# Patient Record
Sex: FEMALE | Race: WHITE | URBAN - METROPOLITAN AREA
[De-identification: names, ages, dates, MRNs, and addresses within clinical notes are randomized per-mention and may not be internally consistent; named-entity substitution may affect disease eponyms.]

---

## 2022-11-30 ENCOUNTER — ESTABLISHED PATIENT (OUTPATIENT)
Dept: URBAN - METROPOLITAN AREA CLINIC 21 | Facility: CLINIC | Age: 47
End: 2022-11-30

## 2022-11-30 DIAGNOSIS — H04.123: ICD-10-CM

## 2022-11-30 DIAGNOSIS — H52.4: ICD-10-CM

## 2022-11-30 DIAGNOSIS — H43.393: ICD-10-CM

## 2022-11-30 PROCEDURE — 92250 FUNDUS PHOTOGRAPHY W/I&R: CPT

## 2022-11-30 PROCEDURE — 92015 DETERMINE REFRACTIVE STATE: CPT

## 2022-11-30 PROCEDURE — 92014 COMPRE OPH EXAM EST PT 1/>: CPT

## 2022-11-30 ASSESSMENT — TONOMETRY
OD_IOP_MMHG: 18
OS_IOP_MMHG: 20

## 2022-11-30 ASSESSMENT — VISUAL ACUITY
OU_CC: 20/20
OS_CC: 20/30
OD_CC: 20/20
OU_CC: J1

## 2022-12-14 ENCOUNTER — ESTABLISHED PATIENT (OUTPATIENT)
Dept: URBAN - METROPOLITAN AREA CLINIC 21 | Facility: CLINIC | Age: 47
End: 2022-12-14

## 2022-12-14 PROCEDURE — 99211NC NO CHARGE VISIT

## 2022-12-14 ASSESSMENT — TONOMETRY
OD_IOP_MMHG: 20
OS_IOP_MMHG: 23

## 2022-12-14 ASSESSMENT — VISUAL ACUITY
OS_CC: 20/20
OD_CC: 20/20
OU_CC: 20/20

## 2023-06-20 ENCOUNTER — TELEPHONE ENCOUNTER (OUTPATIENT)
Dept: URBAN - METROPOLITAN AREA CLINIC 72 | Facility: CLINIC | Age: 48
End: 2023-06-20

## 2023-07-11 ENCOUNTER — OFFICE VISIT (OUTPATIENT)
Dept: URBAN - METROPOLITAN AREA CLINIC 72 | Facility: CLINIC | Age: 48
End: 2023-07-11
Payer: COMMERCIAL

## 2023-07-11 ENCOUNTER — LAB OUTSIDE AN ENCOUNTER (OUTPATIENT)
Dept: URBAN - METROPOLITAN AREA CLINIC 72 | Facility: CLINIC | Age: 48
End: 2023-07-11

## 2023-07-11 VITALS
HEART RATE: 78 BPM | WEIGHT: 149 LBS | TEMPERATURE: 97.3 F | RESPIRATION RATE: 16 BRPM | SYSTOLIC BLOOD PRESSURE: 118 MMHG | HEIGHT: 63 IN | BODY MASS INDEX: 26.4 KG/M2 | DIASTOLIC BLOOD PRESSURE: 69 MMHG

## 2023-07-11 DIAGNOSIS — Z12.11 SCREENING FOR COLON CANCER: ICD-10-CM

## 2023-07-11 DIAGNOSIS — K21.9 CHRONIC GERD: ICD-10-CM

## 2023-07-11 DIAGNOSIS — R10.12 LEFT UPPER QUADRANT PAIN: ICD-10-CM

## 2023-07-11 PROBLEM — 235595009: Status: ACTIVE | Noted: 2023-07-11

## 2023-07-11 PROBLEM — 82423001: Status: ACTIVE | Noted: 2023-07-11

## 2023-07-11 PROCEDURE — 99204 OFFICE O/P NEW MOD 45 MIN: CPT | Performed by: INTERNAL MEDICINE

## 2023-07-11 RX ORDER — ESCITALOPRAM OXALATE 5 MG/1
TAKE 1 TABLET BY MOUTH EVERY DAY TABLET ORAL
Qty: 90 EACH | Refills: 0 | Status: ON HOLD | COMMUNITY

## 2023-07-11 RX ORDER — PANTOPRAZOLE SODIUM 40 MG/1
TAKE 1 TABLET TWICE A DAY BY ORAL ROUTE FOR 90 DAYS TABLET, DELAYED RELEASE ORAL
Qty: 180 EACH | Refills: 0 | Status: ACTIVE | COMMUNITY

## 2023-07-11 RX ORDER — CYCLOBENZAPRINE 5 MG/1
TAKE 1 TABLET 3 TIMES A DAY BY ORAL ROUTE AS NEEDED TABLET, FILM COATED ORAL
Qty: 20 EACH | Refills: 0 | Status: ON HOLD | COMMUNITY

## 2023-07-11 RX ORDER — VENLAFAXINE HYDROCHLORIDE 37.5 MG/1
TAKE 1 CAPSULE BY MOUTH EVERY DAY *** NEEDS APPOINTENT FOR FURTHER REFILLS *** CAPSULE, EXTENDED RELEASE ORAL
Qty: 90 EACH | Refills: 0 | Status: ACTIVE | COMMUNITY

## 2023-07-11 NOTE — HPI-TODAY'S VISIT:
48-year-old female new to the clinic referred by Dr. Wilder for abdominal pain and colonoscopy consult.   A copy of this note will be sent to the referring provider.  Past medical history GERD, anxiety, recurrent UTIs.  Status post laparoscopy 2010 for endometriosis.  CT abdomen and pelvis without contrast 5/31/2023 no acute abdominal or pelvic process.  Scattered atherosclerotic vascular calcifications noted in aorta.  On interview today she reports LUQ pain started years ago getting worse.  Waxes and wanes.  Had some weight gain.  On pantoprazole for GERD.  Bowels are moving normally without blood per rectum.  Takes dicyclomine sparingly    Had flex sig when she was in her 30's Had EGD when she was in college and told she had GERD.

## 2023-07-11 NOTE — PHYSICAL EXAM GASTROINTESTINAL
soft, nondistended, mild tendnerness LUQ right under ribs, no guarding or rigidity, no rebound tenderness

## 2023-08-10 ENCOUNTER — TELEPHONE ENCOUNTER (OUTPATIENT)
Dept: URBAN - METROPOLITAN AREA CLINIC 107 | Facility: CLINIC | Age: 48
End: 2023-08-10

## 2023-08-14 ENCOUNTER — OFFICE VISIT (OUTPATIENT)
Dept: URBAN - METROPOLITAN AREA MEDICAL CENTER 40 | Facility: MEDICAL CENTER | Age: 48
End: 2023-08-14
Payer: COMMERCIAL

## 2023-08-14 DIAGNOSIS — K21.9 CHRONIC GERD: ICD-10-CM

## 2023-08-14 DIAGNOSIS — K29.80 DUODENITIS: ICD-10-CM

## 2023-08-14 DIAGNOSIS — K22.10 EROSIVE ESOPHAGITIS: ICD-10-CM

## 2023-08-14 DIAGNOSIS — R10.12 LEFT UPPER QUADRANT PAIN: ICD-10-CM

## 2023-08-14 DIAGNOSIS — K29.60 OTHER GASTRITIS WITHOUT BLEEDING: ICD-10-CM

## 2023-08-14 PROCEDURE — 43239 EGD BIOPSY SINGLE/MULTIPLE: CPT | Performed by: INTERNAL MEDICINE

## 2023-08-14 RX ORDER — PANTOPRAZOLE SODIUM 40 MG/1
TAKE 1 TABLET TWICE A DAY BY ORAL ROUTE FOR 90 DAYS TABLET, DELAYED RELEASE ORAL
Qty: 180 EACH | Refills: 0 | Status: ACTIVE | COMMUNITY

## 2023-08-14 RX ORDER — VENLAFAXINE HYDROCHLORIDE 37.5 MG/1
TAKE 1 CAPSULE BY MOUTH EVERY DAY *** NEEDS APPOINTENT FOR FURTHER REFILLS *** CAPSULE, EXTENDED RELEASE ORAL
Qty: 90 EACH | Refills: 0 | Status: ACTIVE | COMMUNITY

## 2023-08-14 RX ORDER — CYCLOBENZAPRINE 5 MG/1
TAKE 1 TABLET 3 TIMES A DAY BY ORAL ROUTE AS NEEDED TABLET, FILM COATED ORAL
Qty: 20 EACH | Refills: 0 | Status: ON HOLD | COMMUNITY

## 2023-08-14 RX ORDER — ESCITALOPRAM OXALATE 5 MG/1
TAKE 1 TABLET BY MOUTH EVERY DAY TABLET ORAL
Qty: 90 EACH | Refills: 0 | Status: ON HOLD | COMMUNITY

## 2023-08-24 ENCOUNTER — OFFICE VISIT (OUTPATIENT)
Dept: URBAN - METROPOLITAN AREA CLINIC 72 | Facility: CLINIC | Age: 48
End: 2023-08-24
Payer: COMMERCIAL

## 2023-08-24 VITALS
TEMPERATURE: 97.1 F | BODY MASS INDEX: 26.15 KG/M2 | HEIGHT: 63 IN | SYSTOLIC BLOOD PRESSURE: 99 MMHG | DIASTOLIC BLOOD PRESSURE: 73 MMHG | WEIGHT: 147.6 LBS | HEART RATE: 82 BPM

## 2023-08-24 DIAGNOSIS — K29.80 DUODENITIS: ICD-10-CM

## 2023-08-24 DIAGNOSIS — K21.00 GASTROESOPHAGEAL REFLUX DISEASE WITH ESOPHAGITIS WITHOUT HEMORRHAGE: ICD-10-CM

## 2023-08-24 DIAGNOSIS — R10.12 LEFT UPPER QUADRANT PAIN: ICD-10-CM

## 2023-08-24 PROBLEM — 72007001: Status: ACTIVE | Noted: 2023-08-24

## 2023-08-24 PROBLEM — 266433003: Status: ACTIVE | Noted: 2023-08-24

## 2023-08-24 PROCEDURE — 99214 OFFICE O/P EST MOD 30 MIN: CPT | Performed by: INTERNAL MEDICINE

## 2023-08-24 RX ORDER — VENLAFAXINE HYDROCHLORIDE 37.5 MG/1
TAKE 1 CAPSULE BY MOUTH EVERY DAY *** NEEDS APPOINTENT FOR FURTHER REFILLS *** CAPSULE, EXTENDED RELEASE ORAL
Qty: 90 EACH | Refills: 0 | Status: ACTIVE | COMMUNITY

## 2023-08-24 RX ORDER — CYCLOBENZAPRINE 5 MG/1
TAKE 1 TABLET 3 TIMES A DAY BY ORAL ROUTE AS NEEDED TABLET, FILM COATED ORAL
Qty: 20 EACH | Refills: 0 | Status: ON HOLD | COMMUNITY

## 2023-08-24 RX ORDER — DICYCLOMINE HYDROCHLORIDE 20 MG/1
1 TABLET TABLET ORAL THREE TIMES A DAY
Qty: 90 | Refills: 3 | OUTPATIENT
Start: 2023-08-24 | End: 2023-12-21

## 2023-08-24 RX ORDER — VALACYCLOVIR 500 MG/1
1 TABLET TABLET, FILM COATED ORAL ONCE A DAY
Status: ACTIVE | COMMUNITY

## 2023-08-24 RX ORDER — ESCITALOPRAM OXALATE 5 MG/1
TAKE 1 TABLET BY MOUTH EVERY DAY TABLET ORAL
Qty: 90 EACH | Refills: 0 | Status: ON HOLD | COMMUNITY

## 2023-08-24 RX ORDER — FAMOTIDINE 40 MG/1
1 TABLET TWICE DAILY TABLET, FILM COATED ORAL TWICE A DAY
Qty: 180 TABLET | Refills: 1 | OUTPATIENT
Start: 2023-08-24

## 2023-08-24 RX ORDER — PANTOPRAZOLE SODIUM 40 MG/1
TAKE 1 TABLET TWICE A DAY BY ORAL ROUTE FOR 90 DAYS TABLET, DELAYED RELEASE ORAL
Qty: 180 EACH | Refills: 0 | Status: ON HOLD | COMMUNITY

## 2023-08-24 NOTE — HPI-TODAY'S VISIT:
48-year-old female here for EGD follow-up.    Last seen 7/11/2023 for chronic GERD, left upper quadrant pain due for screening colonoscopy.  EGD and colonoscopy ordered.  Advised to continue her PPI therapy on pantoprazole.  Past medical history GERD, anxiety, recurrent UTIs.  Status post laparoscopy 2010 for endometriosis.  CT abdomen and pelvis without contrast 5/31/2023 no acute abdominal or pelvic process.  Scattered atherosclerotic vascular calcifications noted in aorta.  Had flex sig when she was in her 30's Had EGD when she was in college and told she had GERD.  EGD 8/14/2023 normal.  Esophageal biopsy revealed minimal chronic erosive esophagitis no IM.  Gastric biopsy mild chronic inactive gastritis no IM or HP.  Small bowel biopsy mild chronic duodenitis consistent with peptic related changes.  Today she continues to report LUQ pain started years ago getting worse.  Waxes and wanes.  Had some weight gain.  When she stopped the  Bowels are moving normally without blood per rectum.  In the past and has been told she has had bowel spasms in the past and has had dicyclomine that helped.

## 2023-09-18 ENCOUNTER — OFFICE VISIT (OUTPATIENT)
Dept: URBAN - METROPOLITAN AREA MEDICAL CENTER 40 | Facility: MEDICAL CENTER | Age: 48
End: 2023-09-18
Payer: COMMERCIAL

## 2023-09-18 DIAGNOSIS — Z12.11 SCREENING FOR COLON CANCER: ICD-10-CM

## 2023-09-18 PROCEDURE — G0121 COLON CA SCRN NOT HI RSK IND: HCPCS | Performed by: INTERNAL MEDICINE

## 2023-09-18 RX ORDER — DICYCLOMINE HYDROCHLORIDE 20 MG/1
1 TABLET TABLET ORAL THREE TIMES A DAY
Qty: 90 | Refills: 3 | Status: ACTIVE | COMMUNITY
Start: 2023-08-24 | End: 2023-12-21

## 2023-09-18 RX ORDER — CYCLOBENZAPRINE 5 MG/1
TAKE 1 TABLET 3 TIMES A DAY BY ORAL ROUTE AS NEEDED TABLET, FILM COATED ORAL
Qty: 20 EACH | Refills: 0 | Status: ON HOLD | COMMUNITY

## 2023-09-18 RX ORDER — PANTOPRAZOLE SODIUM 40 MG/1
TAKE 1 TABLET TWICE A DAY BY ORAL ROUTE FOR 90 DAYS TABLET, DELAYED RELEASE ORAL
Qty: 180 EACH | Refills: 0 | Status: ON HOLD | COMMUNITY

## 2023-09-18 RX ORDER — FAMOTIDINE 40 MG/1
1 TABLET TWICE DAILY TABLET, FILM COATED ORAL TWICE A DAY
Qty: 180 TABLET | Refills: 1 | Status: ACTIVE | COMMUNITY
Start: 2023-08-24

## 2023-09-18 RX ORDER — ESCITALOPRAM OXALATE 5 MG/1
TAKE 1 TABLET BY MOUTH EVERY DAY TABLET ORAL
Qty: 90 EACH | Refills: 0 | Status: ON HOLD | COMMUNITY

## 2023-09-18 RX ORDER — VENLAFAXINE HYDROCHLORIDE 37.5 MG/1
TAKE 1 CAPSULE BY MOUTH EVERY DAY *** NEEDS APPOINTENT FOR FURTHER REFILLS *** CAPSULE, EXTENDED RELEASE ORAL
Qty: 90 EACH | Refills: 0 | Status: ACTIVE | COMMUNITY

## 2023-09-18 RX ORDER — VALACYCLOVIR 500 MG/1
1 TABLET TABLET, FILM COATED ORAL ONCE A DAY
Status: ACTIVE | COMMUNITY

## 2023-10-13 ENCOUNTER — DASHBOARD ENCOUNTERS (OUTPATIENT)
Age: 48
End: 2023-10-13

## 2023-10-13 ENCOUNTER — OFFICE VISIT (OUTPATIENT)
Dept: URBAN - METROPOLITAN AREA CLINIC 72 | Facility: CLINIC | Age: 48
End: 2023-10-13
Payer: COMMERCIAL

## 2023-10-13 VITALS
SYSTOLIC BLOOD PRESSURE: 108 MMHG | BODY MASS INDEX: 25.41 KG/M2 | HEART RATE: 74 BPM | DIASTOLIC BLOOD PRESSURE: 69 MMHG | HEIGHT: 63 IN | WEIGHT: 143.4 LBS | TEMPERATURE: 97.1 F

## 2023-10-13 DIAGNOSIS — K29.80 DUODENITIS: ICD-10-CM

## 2023-10-13 DIAGNOSIS — K21.00 GASTROESOPHAGEAL REFLUX DISEASE WITH ESOPHAGITIS WITHOUT HEMORRHAGE: ICD-10-CM

## 2023-10-13 PROCEDURE — 99214 OFFICE O/P EST MOD 30 MIN: CPT | Performed by: INTERNAL MEDICINE

## 2023-10-13 RX ORDER — PANTOPRAZOLE SODIUM 40 MG/1
TAKE 1 TABLET TWICE A DAY BY ORAL ROUTE FOR 90 DAYS TABLET, DELAYED RELEASE ORAL
Qty: 180 EACH | Refills: 0 | Status: ON HOLD | COMMUNITY

## 2023-10-13 RX ORDER — DICYCLOMINE HYDROCHLORIDE 20 MG/1
1 TABLET TABLET ORAL THREE TIMES A DAY
Qty: 90 | Refills: 3 | Status: ON HOLD | COMMUNITY
Start: 2023-08-24 | End: 2023-12-21

## 2023-10-13 RX ORDER — FAMOTIDINE 40 MG/1
1 TABLET TWICE DAILY TABLET, FILM COATED ORAL TWICE A DAY
Qty: 180 TABLET | Refills: 3 | OUTPATIENT

## 2023-10-13 RX ORDER — ESCITALOPRAM OXALATE 5 MG/1
TAKE 1 TABLET BY MOUTH EVERY DAY TABLET ORAL
Qty: 90 EACH | Refills: 0 | Status: ON HOLD | COMMUNITY

## 2023-10-13 RX ORDER — CYCLOBENZAPRINE 5 MG/1
TAKE 1 TABLET 3 TIMES A DAY BY ORAL ROUTE AS NEEDED TABLET, FILM COATED ORAL
Qty: 20 EACH | Refills: 0 | Status: ON HOLD | COMMUNITY

## 2023-10-13 RX ORDER — VALACYCLOVIR 500 MG/1
1 TABLET TABLET, FILM COATED ORAL ONCE A DAY
Status: ON HOLD | COMMUNITY

## 2023-10-13 RX ORDER — VENLAFAXINE HYDROCHLORIDE 37.5 MG/1
TAKE 1 CAPSULE BY MOUTH EVERY DAY *** NEEDS APPOINTENT FOR FURTHER REFILLS *** CAPSULE, EXTENDED RELEASE ORAL
Qty: 90 EACH | Refills: 0 | Status: ACTIVE | COMMUNITY

## 2023-10-13 RX ORDER — FAMOTIDINE 40 MG/1
1 TABLET TWICE DAILY TABLET, FILM COATED ORAL TWICE A DAY
Qty: 180 TABLET | Refills: 1 | Status: ACTIVE | COMMUNITY
Start: 2023-08-24

## 2023-10-13 NOTE — HPI-TODAY'S VISIT:
48-year-old female here for colonoscopy follow-up.    Last seen 8/24/2023 for follow-up after her EGD due for screening colonoscopy.  For GERD started on famotidine 40 mg twice a day.  She was having some left upper quadrant pain started on dicyclomine.  Past medical history GERD, anxiety, recurrent UTIs.  Status post laparoscopy 2010 for endometriosis.  Today she is doing well.  She is on the famotidine twice daily which controls her GERD.  Abdominal discomfort is minimal and altered bowel movements are under control.  No melena or hematochezia.

## 2023-11-06 ENCOUNTER — ERX REFILL RESPONSE (OUTPATIENT)
Dept: URBAN - METROPOLITAN AREA CLINIC 72 | Facility: CLINIC | Age: 48
End: 2023-11-06

## 2023-11-06 RX ORDER — DICYCLOMINE HYDROCHLORIDE 20 MG/1
1 TABLET TABLET ORAL THREE TIMES A DAY
Qty: 90 | Refills: 3 | OUTPATIENT

## 2023-11-06 RX ORDER — DICYCLOMINE HYDROCHLORIDE 20 MG/1
1 TABLET TABLET ORAL THREE TIMES A DAY
Qty: 270 TABLET | Refills: 1 | OUTPATIENT

## 2024-05-13 ENCOUNTER — ERX REFILL RESPONSE (OUTPATIENT)
Dept: URBAN - METROPOLITAN AREA CLINIC 72 | Facility: CLINIC | Age: 49
End: 2024-05-13

## 2024-05-13 RX ORDER — DICYCLOMINE HYDROCHLORIDE 20 MG/1
TAKE 1 TABLET BY MOUTH 3 TIMES A DAY IF NEEDED FOR ABDOMINAL PAIN OR LOOSE STOOL 90 DAYS TABLET ORAL
Qty: 270 TABLET | Refills: 1 | OUTPATIENT

## 2024-05-13 RX ORDER — DICYCLOMINE HYDROCHLORIDE 20 MG/1
1 TABLET TABLET ORAL THREE TIMES A DAY
Qty: 270 TABLET | Refills: 1 | OUTPATIENT